# Patient Record
Sex: FEMALE | Race: BLACK OR AFRICAN AMERICAN | NOT HISPANIC OR LATINO | URBAN - METROPOLITAN AREA
[De-identification: names, ages, dates, MRNs, and addresses within clinical notes are randomized per-mention and may not be internally consistent; named-entity substitution may affect disease eponyms.]

---

## 2021-12-19 ENCOUNTER — EMERGENCY (EMERGENCY)
Facility: HOSPITAL | Age: 36
LOS: 1 days | Discharge: HOME | End: 2021-12-19
Attending: EMERGENCY MEDICINE
Payer: COMMERCIAL

## 2021-12-19 VITALS
SYSTOLIC BLOOD PRESSURE: 130 MMHG | TEMPERATURE: 99 F | HEART RATE: 87 BPM | OXYGEN SATURATION: 100 % | RESPIRATION RATE: 18 BRPM | DIASTOLIC BLOOD PRESSURE: 76 MMHG

## 2021-12-19 VITALS
RESPIRATION RATE: 20 BRPM | HEIGHT: 62 IN | WEIGHT: 169.98 LBS | DIASTOLIC BLOOD PRESSURE: 74 MMHG | OXYGEN SATURATION: 98 % | SYSTOLIC BLOOD PRESSURE: 112 MMHG | HEART RATE: 70 BPM | TEMPERATURE: 99 F

## 2021-12-19 LAB
ALBUMIN SERPL ELPH-MCNC: 3.8 G/DL — SIGNIFICANT CHANGE UP (ref 3.3–5)
ALP SERPL-CCNC: 57 U/L — SIGNIFICANT CHANGE UP (ref 40–120)
ALT FLD-CCNC: 16 U/L — SIGNIFICANT CHANGE UP (ref 10–45)
ANION GAP SERPL CALC-SCNC: 13 MMOL/L — SIGNIFICANT CHANGE UP (ref 5–17)
AST SERPL-CCNC: 11 U/L — SIGNIFICANT CHANGE UP (ref 10–40)
BASE EXCESS BLDV CALC-SCNC: 1.2 MMOL/L — SIGNIFICANT CHANGE UP (ref -2–2)
BASOPHILS # BLD AUTO: 0.02 K/UL — SIGNIFICANT CHANGE UP (ref 0–0.2)
BASOPHILS NFR BLD AUTO: 0.2 % — SIGNIFICANT CHANGE UP (ref 0–2)
BILIRUB SERPL-MCNC: 0.4 MG/DL — SIGNIFICANT CHANGE UP (ref 0.2–1.2)
BUN SERPL-MCNC: 10 MG/DL — SIGNIFICANT CHANGE UP (ref 7–23)
CA-I SERPL-SCNC: 1.26 MMOL/L — SIGNIFICANT CHANGE UP (ref 1.15–1.33)
CALCIUM SERPL-MCNC: 9.5 MG/DL — SIGNIFICANT CHANGE UP (ref 8.4–10.5)
CHLORIDE BLDV-SCNC: 104 MMOL/L — SIGNIFICANT CHANGE UP (ref 96–108)
CHLORIDE SERPL-SCNC: 104 MMOL/L — SIGNIFICANT CHANGE UP (ref 96–108)
CO2 BLDV-SCNC: 29 MMOL/L — HIGH (ref 22–26)
CO2 SERPL-SCNC: 23 MMOL/L — SIGNIFICANT CHANGE UP (ref 22–31)
CREAT SERPL-MCNC: 0.71 MG/DL — SIGNIFICANT CHANGE UP (ref 0.5–1.3)
EOSINOPHIL # BLD AUTO: 0.05 K/UL — SIGNIFICANT CHANGE UP (ref 0–0.5)
EOSINOPHIL NFR BLD AUTO: 0.6 % — SIGNIFICANT CHANGE UP (ref 0–6)
GAS PNL BLDV: 137 MMOL/L — SIGNIFICANT CHANGE UP (ref 136–145)
GAS PNL BLDV: SIGNIFICANT CHANGE UP
GAS PNL BLDV: SIGNIFICANT CHANGE UP
GLUCOSE BLDV-MCNC: 79 MG/DL — SIGNIFICANT CHANGE UP (ref 70–99)
GLUCOSE SERPL-MCNC: 80 MG/DL — SIGNIFICANT CHANGE UP (ref 70–99)
HCO3 BLDV-SCNC: 27 MMOL/L — SIGNIFICANT CHANGE UP (ref 22–29)
HCT VFR BLD CALC: 30.4 % — LOW (ref 34.5–45)
HCT VFR BLDA CALC: 31 % — LOW (ref 34.5–46.5)
HGB BLD CALC-MCNC: 10.3 G/DL — LOW (ref 11.7–16.1)
HGB BLD-MCNC: 9.2 G/DL — LOW (ref 11.5–15.5)
IMM GRANULOCYTES NFR BLD AUTO: 0.7 % — SIGNIFICANT CHANGE UP (ref 0–1.5)
LACTATE BLDV-MCNC: 1 MMOL/L — SIGNIFICANT CHANGE UP (ref 0.7–2)
LYMPHOCYTES # BLD AUTO: 0.95 K/UL — LOW (ref 1–3.3)
LYMPHOCYTES # BLD AUTO: 11.3 % — LOW (ref 13–44)
MCHC RBC-ENTMCNC: 27.5 PG — SIGNIFICANT CHANGE UP (ref 27–34)
MCHC RBC-ENTMCNC: 30.3 GM/DL — LOW (ref 32–36)
MCV RBC AUTO: 90.7 FL — SIGNIFICANT CHANGE UP (ref 80–100)
MONOCYTES # BLD AUTO: 0.66 K/UL — SIGNIFICANT CHANGE UP (ref 0–0.9)
MONOCYTES NFR BLD AUTO: 7.9 % — SIGNIFICANT CHANGE UP (ref 2–14)
NEUTROPHILS # BLD AUTO: 6.64 K/UL — SIGNIFICANT CHANGE UP (ref 1.8–7.4)
NEUTROPHILS NFR BLD AUTO: 79.3 % — HIGH (ref 43–77)
NRBC # BLD: 0 /100 WBCS — SIGNIFICANT CHANGE UP (ref 0–0)
PCO2 BLDV: 48 MMHG — HIGH (ref 39–42)
PH BLDV: 7.36 — SIGNIFICANT CHANGE UP (ref 7.32–7.43)
PLATELET # BLD AUTO: 468 K/UL — HIGH (ref 150–400)
PO2 BLDV: 23 MMHG — LOW (ref 25–45)
POTASSIUM BLDV-SCNC: 4.4 MMOL/L — SIGNIFICANT CHANGE UP (ref 3.5–5.1)
POTASSIUM SERPL-MCNC: 4.4 MMOL/L — SIGNIFICANT CHANGE UP (ref 3.5–5.3)
POTASSIUM SERPL-SCNC: 4.4 MMOL/L — SIGNIFICANT CHANGE UP (ref 3.5–5.3)
PROT SERPL-MCNC: 7.3 G/DL — SIGNIFICANT CHANGE UP (ref 6–8.3)
RBC # BLD: 3.35 M/UL — LOW (ref 3.8–5.2)
RBC # FLD: 17.5 % — HIGH (ref 10.3–14.5)
SAO2 % BLDV: 32.2 % — LOW (ref 67–88)
SODIUM SERPL-SCNC: 140 MMOL/L — SIGNIFICANT CHANGE UP (ref 135–145)
WBC # BLD: 8.38 K/UL — SIGNIFICANT CHANGE UP (ref 3.8–10.5)
WBC # FLD AUTO: 8.38 K/UL — SIGNIFICANT CHANGE UP (ref 3.8–10.5)

## 2021-12-19 PROCEDURE — 74177 CT ABD & PELVIS W/CONTRAST: CPT | Mod: 26,MA

## 2021-12-19 PROCEDURE — 36415 COLL VENOUS BLD VENIPUNCTURE: CPT

## 2021-12-19 PROCEDURE — 85014 HEMATOCRIT: CPT

## 2021-12-19 PROCEDURE — 84295 ASSAY OF SERUM SODIUM: CPT

## 2021-12-19 PROCEDURE — 82803 BLOOD GASES ANY COMBINATION: CPT

## 2021-12-19 PROCEDURE — 83605 ASSAY OF LACTIC ACID: CPT

## 2021-12-19 PROCEDURE — 82330 ASSAY OF CALCIUM: CPT

## 2021-12-19 PROCEDURE — 84702 CHORIONIC GONADOTROPIN TEST: CPT

## 2021-12-19 PROCEDURE — 76705 ECHO EXAM OF ABDOMEN: CPT

## 2021-12-19 PROCEDURE — 82947 ASSAY GLUCOSE BLOOD QUANT: CPT

## 2021-12-19 PROCEDURE — 74177 CT ABD & PELVIS W/CONTRAST: CPT | Mod: MA

## 2021-12-19 PROCEDURE — 82435 ASSAY OF BLOOD CHLORIDE: CPT

## 2021-12-19 PROCEDURE — 99285 EMERGENCY DEPT VISIT HI MDM: CPT

## 2021-12-19 PROCEDURE — 84132 ASSAY OF SERUM POTASSIUM: CPT

## 2021-12-19 PROCEDURE — 80053 COMPREHEN METABOLIC PANEL: CPT

## 2021-12-19 PROCEDURE — 76705 ECHO EXAM OF ABDOMEN: CPT | Mod: 26

## 2021-12-19 PROCEDURE — 99284 EMERGENCY DEPT VISIT MOD MDM: CPT | Mod: 25

## 2021-12-19 PROCEDURE — 85018 HEMOGLOBIN: CPT

## 2021-12-19 PROCEDURE — 85025 COMPLETE CBC W/AUTO DIFF WBC: CPT

## 2021-12-19 NOTE — ED PROVIDER NOTE - PROGRESS NOTE DETAILS
Pt's surgeon Dr. Mendez was informed of pt's status and will see pt tomorrow Monday for reevaluation. JUAN. Awaiting for CT. Pt went to CT. Pt's was informed of test results including incidental finding of left adnexal cyst. She will f/u with her surgeon for wound check and GYN for adnexal cyst. Paged her surgeon Dr. Palafox for appointment confirmation and utding CT result. Pt's was informed of test results including incidental finding of left adnexal cyst. She will f/u with her surgeon for wound check and GYN for adnexal cyst. Paged her surgeon Dr. Palafox for appointment confirmation and utdating CT result. Dr. Palafox was informed of ct scan and will see pt on Tuesday 8am.

## 2021-12-19 NOTE — ED PROVIDER NOTE - OBJECTIVE STATEMENT
Attending Nikky Smith: 35 yo female h/o recent tummy tuck surgery on 12/3/2021 [place] who did well post operative and completed course of post operative abx, last dose a few days ago presenting with concern for change to appearance of wound. pt states does not feel anything to lower part of her abdomen and that is her baseline. noticed 3days ago increased drainage to from the umbilical wound and different appearance. denies any straining. no fevers or chills. did recently remove her drains. no fevers or chills. has not taken anything for pain. has had prior gastric sleeve 2018. normal bowel movements. and passing gas

## 2021-12-19 NOTE — ED PROVIDER NOTE - CLINICAL SUMMARY MEDICAL DECISION MAKING FREE TEXT BOX
Attending Nikky Smith: 37 yo female s/p recent breast surgery and tummy tuck presenting with concern for wound dehiscence. upon arrival pt well appearing. pt afebrile. ct scan performed to further evaluate without evidence of abscess or wound infeciton. spoke with pt;s surgeon, able to follow up tomorrow. no fevers or chills. will re-dress wound. continued wound care.

## 2021-12-19 NOTE — ED PROVIDER NOTE - ATTENDING CONTRIBUTION TO CARE
Attending MD Nikky Smith:   I personally have seen and examined this patient.  NP note reviewed and agree on plan of care and except where noted.  See HPI, PE, and MDM for details.

## 2021-12-19 NOTE — ED ADULT TRIAGE NOTE - CHIEF COMPLAINT QUOTE
pt states surgical wound opening surgery 12/4 had tummy tuck in [place] pt states wound has opened and is draining

## 2021-12-19 NOTE — ED PROVIDER NOTE - NSFOLLOWUPCLINICS_GEN_ALL_ED_FT
[facility]  Gynceology/OB  [address] [place] [address]  Phone: [phone]  Fax:

## 2021-12-19 NOTE — ED PROVIDER NOTE - NSFOLLOWUPINSTRUCTIONS_ED_ALL_ED_FT
Hydrate.    Keep the wound clean and dry as directed by your plastic surgeon Dr. Mendez.    Follow up with your surgeon Thursday 8am for wound check.    Follow up with your GYN or GYN clinic, [phone], for incidental finding of left adnexal cyst.     Return for any concerns, fever, chills, abdominal pain or any worsening symptoms.

## 2021-12-19 NOTE — ED PROVIDER NOTE - PATIENT PORTAL LINK FT
You can access the FollowMyHealth Patient Portal offered by [facility] by registering at the following website: [email or web address]. By joining [facility]’s FollowMyHealth portal, you will also be able to view your health information using other applications (apps) compatible with our system.

## 2021-12-19 NOTE — ED PROVIDER NOTE - PHYSICAL EXAMINATION
NAD, VSS, Afebrile, Lungs clear. s/p Breast reduction with multiple sterile strips on b/l nipple and under breast and ecchymosis around, no visualized infection signs and discharge. +Multiple sterile strips on tummy tuck surgery with dehiscence, clear discharge, and localized skin necrotic changes. No abdominal tender. No CVA tender. No calf tender. Neuro- intact. NAD, VSS, Afebrile, Lungs clear. s/p Breast reduction with multiple sterile strips on b/l nipple and under breast and ecchymosis around, no visualized infection signs and discharge. +Multiple sterile strips on tummy tuck surgery with dehiscence, clear discharge, and localized skin necrotic changes. No abdominal tender. No CVA tender. No calf tender. Neuro- intact.  Attending Nikky Smith: Gen: NAD, heent: atrauamtic, eomi, perrla, mmm, op pink, uvula midline, neck; nttp, no nuchal rigidity, chest: nttp, no crepitus, cv: rrr, no murmurs, lungs: ctab, abd: soft, nontender, horizonatal incision to abdomen with small areas of ecchymoes most notably to midlin. sterile strips to abodmen. no active purulent drainage. wound to umbilical area, will small amount of serosangiusin drainage. no guarding, ext: wwp, neg homans, skin: no rash, neuro: awake and alert, following commands, speech clear, sensation and strength intact, no focal deficits

## 2021-12-19 NOTE — ED ADULT NURSE NOTE - OBJECTIVE STATEMENT
Pt A&OX4, NAD noted. Presents to ED c/o surgical site complication. Pt s/p breast reduction and tummy tuck 12/3/21. Incision to umbilical area open with slight drainage, darkened wound to mid lower abdomen. Pt denies pain. Respirations non-labored and easy. Skin warm, dry, color normal for race. Plan of care discussed. ED workup in progress.

## 2023-05-07 NOTE — ED ADULT NURSE NOTE - NS_ED_NURSE_TEACHING_TOPIC_ED_A_ED
[facility] Provider Note     Date:   2023   Name: Jesus Alberto Sue  :   1955  PCP: Sameer Desai MD    Chief Complaint   Patient presents with   • Hospital Follow Up Visit       Subjective     History of Present Illness  Jesus Alberto Sue is a 67 y.o. male who presents today for KYLEIGH, mild CAD, and aortic valve stenosis.  Since last visit patient has had a TAVR bioprosthesis with follow-up echo.  Unfortunately on his 2023 post TAVR echo EF still 35 to 40%.  Patient is wanting to know when he will be able to have his umbilical repair surgery.  Spoke with Dr. Ana Dial, and she spoke with patient's valve surgeon, Dr. Lutz.  According to Dr. Dial patient can move forward with umbilical hernia repair surgery.  Per Dr. Dial we will also start patient on a beta-blocker and Entresto. I have asked him to stop Losartan. We will see him back in 2 weeks to see how he is tolerating Entresto and metoprolol.  Hopefully we will be able to titrate both up.  We will repeat echo in 3 months.    I gave him enough samples to get through on starting dose of Entresto for 2 weeks.  We will go ahead and send the higher dose into the pharmacy, but he knows not to pick them up or start them until after we see him at follow-up to make sure he is doing okay and tolerating before we titrate up.    Post TAVR EKG obtained today.  No signs of bundle branch blockage.    He denies any chest pain,, shortness of air, edema, palpitations, dizziness, or syncope.  He reports he felt fine before the TAVR and he feels fine after.    Baseline AHI of 23 on 2022.    Mild CAD on left heart cath 2021    TAVR bioprothesis 3/2023    Post TAVR echo 2023 EF 35-40%        Cardiomyopathy and sleep related problem with  1.  S/P TAVR bioprosthesis 3/2023  2.  CAD-mild with cath 2021  3.  KYLEIGH  4.  Hyperlipidemia  5.  Hypertension  6.  Latent TB    Baseline AHI of 23 on 2022.    Mild CAD  on left heart cath 6/2021    TAVR bioprothesis 3/2023    Post TAVR echo 4/2023 EF 35-40%    No Known Allergies    Current Outpatient Medications:   •  ascorbic acid (VITAMIN C) 1000 MG tablet, Take 1 tablet by mouth Daily., Disp: , Rfl:   •  aspirin 81 MG EC tablet, Take 1 tablet by mouth Daily., Disp: 30 tablet, Rfl: 11  •  atorvastatin (LIPITOR) 20 MG tablet, Take 1 tablet by mouth Daily., Disp: , Rfl:   •  Calcium-Phosphorus-Vitamin D (CITRACAL +D3 PO), Take 1 tablet by mouth Daily., Disp: , Rfl:   •  Flaxseed, Linseed, (Flaxseed Oil) 1400 MG capsule, Take 2 tablets by mouth Daily., Disp: , Rfl:   •  loratadine (CLARITIN) 10 MG tablet, Take 1 tablet by mouth Daily As Needed for Allergies., Disp: , Rfl:   •  multivitamin with minerals tablet tablet, Take 1 tablet by mouth Daily., Disp: , Rfl:   •  omeprazole (priLOSEC) 40 MG capsule, Take 1 capsule by mouth Daily., Disp: , Rfl:   •  ondansetron (ZOFRAN) 4 MG tablet, Take 1 tablet by mouth Every 6 (Six) Hours As Needed., Disp: , Rfl:   •  triamcinolone (KENALOG) 0.1 % cream, Apply 1 application topically to the appropriate area as directed 2 (Two) Times a Day As Needed for Rash., Disp: , Rfl:   •  Turmeric 500 MG capsule, Take 2 capsules by mouth Daily., Disp: , Rfl:   •  metoprolol succinate XL (TOPROL-XL) 25 MG 24 hr tablet, Take 1 tablet by mouth Daily., Disp: 90 tablet, Rfl: 1  •  sacubitril-valsartan (Entresto) 49-51 MG tablet, Take 1 tablet by mouth 2 (Two) Times a Day., Disp: 180 tablet, Rfl: 1    Past Medical History:   Diagnosis Date   • Acid reflux    • COVID-19 12/2022   • Heart murmur    • High cholesterol    • History of stomach ulcers    • Hypertension    • PONV (postoperative nausea and vomiting)    • Sleep apnea     CPAP   • TB lung, latent       Past Surgical History:   Procedure Laterality Date   • AORTIC VALVE REPAIR/REPLACEMENT N/A 3/23/2023    Procedure: TRANSCATHETER AORTIC VALVE REPLACEMENT;  Surgeon: Lauri Kc MD;  Location: [facility]  "HYBRID [facility]ERTHA;  Service: Cardiothoracic;  Laterality: N/A;  fl  9m30s  dose 340mgy  contrast 80ml   • AORTIC VALVE REPAIR/REPLACEMENT N/A 3/23/2023    Procedure: Transfemoral Transcatheter Aortic Valve Replacement;  Surgeon: Rosa Lutz MD;  Location: [facility];  Service: Cardiovascular;  Laterality: N/A;   • CARDIAC CATHETERIZATION      NO INTERVENTION   • COLONOSCOPY     • KNEE SURGERY Left 1977    REPAIR   • LASIK Bilateral 2022     Family History   Problem Relation Age of Onset   • Cancer Mother    • Parkinsonism Father    • Heart disease Father    • Heart attack Brother    • No Known Problems Brother    • Obesity Brother      Social History     Socioeconomic History   • Marital status:    • Number of children: 0   Tobacco Use   • Smoking status: Never   • Smokeless tobacco: Former     Types: Snuff     Quit date: 1998   Vaping Use   • Vaping Use: Never used   Substance and Sexual Activity   • Alcohol use: Yes     Alcohol/week: 2.0 standard drinks     Types: 2 Shots of liquor per week     Comment: 7 days a week   • Drug use: Never   • Sexual activity: Defer       Objective     Vital Signs:  /70 (BP Location: Left arm, Patient Position: Sitting)   Pulse 85   Ht 172.7 cm (68\")   Wt 75.3 kg (166 lb)   SpO2 98%   BMI 25.24 kg/m²   Estimated body mass index is 25.24 kg/m² as calculated from the following:    Height as of this encounter: 172.7 cm (68\").    Weight as of this encounter: 75.3 kg (166 lb).       BMI is >= 25 and <30. (Overweight) The following options were offered after discussion;: referral to primary care      Physical Exam  Vitals reviewed.   Constitutional:       Appearance: Normal appearance.   Eyes:      Pupils: Pupils are equal, round, and reactive to light.   Neck:      Vascular: No carotid bruit.   Cardiovascular:      Rate and Rhythm: Normal rate and regular rhythm.      Pulses: Normal pulses.      Heart sounds: Normal heart sounds.      Comments: " TAVR  Pulmonary:      Effort: Pulmonary effort is normal.      Breath sounds: Normal breath sounds.   Musculoskeletal:         General: Normal range of motion.      Right lower leg: No edema.      Left lower leg: No edema.   Skin:     General: Skin is warm and dry.      Capillary Refill: Capillary refill takes less than 2 seconds.   Neurological:      Mental Status: He is alert and oriented to person, place, and time.      Gait: Gait is intact.   Psychiatric:         Attention and Perception: Attention normal.         Mood and Affect: Mood normal.           Assessment and Plan     Diagnoses and all orders for this visit:    1. Acute systolic congestive heart failure  Assessment & Plan:  EF 35-40%, starting Entresto. Will increase in 2 weeks and recheck echo in 3 months.    Orders:  -     Discontinue: sacubitril-valsartan (Entresto) 24-26 MG tablet; Take 1 tablet by mouth 2 (Two) Times a Day.  Dispense: 180 tablet; Refill: 1  -     sacubitril-valsartan (Entresto) 49-51 MG tablet; Take 1 tablet by mouth 2 (Two) Times a Day.  Dispense: 180 tablet; Refill: 1  -     metoprolol succinate XL (TOPROL-XL) 25 MG 24 hr tablet; Take 1 tablet by mouth Daily.  Dispense: 90 tablet; Refill: 1  -     ECG 12 Lead    2. S/P TAVR using bioprosthesis 3/23/2023  Assessment & Plan:  Doing well postop.  Per Dr. Lutz okay to move forward with umbilical hernia repair surgery.  Post TAVR EKG obtained today.      3. Umbilical hernia without obstruction or gangrene  Assessment & Plan:  Okay to proceed with umbilical hernia surgery from cardiac standpoint.      4. Obstructive sleep apnea syndrome  Assessment & Plan:  Benefiting from PAP therapy.  Plan to continue.  Hopefully AHI will decrease once better mask fit and less leak.  Recommend KYLEIGH precautions for anesthesia.        Recommendations: ER if symptoms increase, Limitations of stress testing for definitive diagnosis reviewed, Sleep hygiene discussed, Sleep risks reviewed (driving,  medical, sleep death, sedating agents), Limit salt and Report if any new/changing symptoms immediately      Follow Up  Return in about 2 weeks (around 5/19/2023).  Patient was given instructions and counseling regarding his condition or for health maintenance advice. Please see specific information pulled into the AVS if appropriate.   Wound care